# Patient Record
Sex: FEMALE | Race: OTHER | NOT HISPANIC OR LATINO | URBAN - METROPOLITAN AREA
[De-identification: names, ages, dates, MRNs, and addresses within clinical notes are randomized per-mention and may not be internally consistent; named-entity substitution may affect disease eponyms.]

---

## 2023-02-23 ENCOUNTER — OFFICE (OUTPATIENT)
Dept: URBAN - METROPOLITAN AREA CLINIC 11 | Facility: CLINIC | Age: 18
End: 2023-02-23

## 2023-02-23 VITALS
HEART RATE: 81 BPM | OXYGEN SATURATION: 100 % | HEIGHT: 66 IN | DIASTOLIC BLOOD PRESSURE: 78 MMHG | WEIGHT: 141 LBS | BODY MASS INDEX: 22.66 KG/M2 | SYSTOLIC BLOOD PRESSURE: 126 MMHG

## 2023-02-23 DIAGNOSIS — K62.89 OTHER SPECIFIED DISEASES OF ANUS AND RECTUM: ICD-10-CM

## 2023-02-23 DIAGNOSIS — R14.0 ABDOMINAL DISTENSION (GASEOUS): ICD-10-CM

## 2023-02-23 DIAGNOSIS — D50.9 IRON DEFICIENCY ANEMIA, UNSPECIFIED: ICD-10-CM

## 2023-02-23 PROCEDURE — 99203 OFFICE O/P NEW LOW 30 MIN: CPT | Performed by: INTERNAL MEDICINE

## 2023-02-23 RX ORDER — FAMOTIDINE 40 MG/1
80 TABLET, FILM COATED ORAL
Qty: 180 | Refills: 3 | Status: COMPLETED
Start: 2023-02-23 | End: 2024-04-29

## 2023-02-23 NOTE — SERVICENOTES
Attending Attestation:  Patient seen and examined independently by me, Dr. Travon Cabral.  Prior records reviewed and case discussed with NANCI Reyna after which impression and plan were developed.

## 2023-02-23 NOTE — SERVICEHPINOTES
Ms. Steele presents today for bloating that presented over the past 6 months. It tends to occur 4 times per week. It is aggravated by eating. No particular aggravating foods. Taking Pepcid as needed does some. She was seen by an allergist and is awaiting lab results. She has retrosternal burning once every 2-3 weeks. No issues with dysphagia.saturnino Romerobonita has had issues with rectal pain that occurs typically prior to her menstrual cycle. The pain will last for about an hour at a time a few times over 2-3 days. It is is characterized as a bad cramp. She denies presence of rectal protrusions. No rectal itching. No rectal bleeding. After the pain is present she may go a day without a bowel movement, but otherwise she has 1-2 bowel movements per day. Stools are formed, brown, and soft.  Often before she has a bowel movement she gets light-headed and nauseous. After the bowel movement the light-headedness and nausea will resolve. She denies vomiting.
saturnino matamoros She was recently noted to have anemia with her PCP, but she is unsure who her PCP is. She notes that the first 3 days of her cycle are very heavy. She will have to change her pads every 2-3 hours at that time. She was placed on iron, and it caused her stomach to be upset.
saturnino matamoros MD Note:    Six months of bloating which is most prominent in the periumbilical region but radiates through the abdomen.  Bloating may last up to 24 hours when present.  There is no excessive belching or flatus.  Bloating is not modified by defecation.  Bloating generally begins after eating.  She has occasional constipation.  Minimal improvement in her bloating with Pepcid.  Long history of intermittent rectal pain with this being absent for up to six months.  Pain is most common just before menstrual cycle last 2-3 days.  Pain is sharp in character.  There is no anorectal itching or burning.  No hematochezia.  She reports a recent diagnosis of iron deficiency with menorrhagia.

## 2023-02-24 LAB — C-REACTIVE PROTEIN, QUANT: <1 MG/L

## 2023-07-03 ENCOUNTER — AMBULATORY SURGICAL CENTER (OUTPATIENT)
Dept: URBAN - METROPOLITAN AREA SURGERY 3 | Facility: SURGERY | Age: 18
End: 2023-07-03

## 2023-07-03 ENCOUNTER — OFFICE (OUTPATIENT)
Dept: URBAN - METROPOLITAN AREA PATHOLOGY 12 | Facility: PATHOLOGY | Age: 18
End: 2023-07-03

## 2023-07-03 VITALS
HEART RATE: 71 BPM | RESPIRATION RATE: 17 BRPM | SYSTOLIC BLOOD PRESSURE: 117 MMHG | SYSTOLIC BLOOD PRESSURE: 122 MMHG | DIASTOLIC BLOOD PRESSURE: 58 MMHG | DIASTOLIC BLOOD PRESSURE: 59 MMHG | RESPIRATION RATE: 20 BRPM | WEIGHT: 150 LBS | OXYGEN SATURATION: 100 % | OXYGEN SATURATION: 100 % | SYSTOLIC BLOOD PRESSURE: 104 MMHG | DIASTOLIC BLOOD PRESSURE: 68 MMHG | SYSTOLIC BLOOD PRESSURE: 98 MMHG | HEART RATE: 66 BPM | DIASTOLIC BLOOD PRESSURE: 54 MMHG | SYSTOLIC BLOOD PRESSURE: 92 MMHG | RESPIRATION RATE: 17 BRPM | OXYGEN SATURATION: 99 % | TEMPERATURE: 98.1 F | HEART RATE: 86 BPM | RESPIRATION RATE: 19 BRPM | HEIGHT: 66 IN | RESPIRATION RATE: 16 BRPM | SYSTOLIC BLOOD PRESSURE: 104 MMHG | DIASTOLIC BLOOD PRESSURE: 59 MMHG | TEMPERATURE: 98.1 F | RESPIRATION RATE: 19 BRPM | OXYGEN SATURATION: 99 % | HEART RATE: 86 BPM | OXYGEN SATURATION: 99 % | SYSTOLIC BLOOD PRESSURE: 104 MMHG | TEMPERATURE: 98.1 F | HEIGHT: 66 IN | HEART RATE: 64 BPM | DIASTOLIC BLOOD PRESSURE: 58 MMHG | TEMPERATURE: 98.2 F | SYSTOLIC BLOOD PRESSURE: 117 MMHG | DIASTOLIC BLOOD PRESSURE: 59 MMHG | RESPIRATION RATE: 20 BRPM | RESPIRATION RATE: 16 BRPM | SYSTOLIC BLOOD PRESSURE: 92 MMHG | SYSTOLIC BLOOD PRESSURE: 117 MMHG | RESPIRATION RATE: 20 BRPM | DIASTOLIC BLOOD PRESSURE: 69 MMHG | DIASTOLIC BLOOD PRESSURE: 68 MMHG | HEIGHT: 66 IN | HEART RATE: 66 BPM | HEART RATE: 71 BPM | OXYGEN SATURATION: 100 % | DIASTOLIC BLOOD PRESSURE: 54 MMHG | HEART RATE: 86 BPM | DIASTOLIC BLOOD PRESSURE: 69 MMHG | DIASTOLIC BLOOD PRESSURE: 68 MMHG | DIASTOLIC BLOOD PRESSURE: 69 MMHG | DIASTOLIC BLOOD PRESSURE: 58 MMHG | SYSTOLIC BLOOD PRESSURE: 98 MMHG | RESPIRATION RATE: 19 BRPM | SYSTOLIC BLOOD PRESSURE: 98 MMHG | WEIGHT: 150 LBS | RESPIRATION RATE: 16 BRPM | HEART RATE: 59 BPM | SYSTOLIC BLOOD PRESSURE: 122 MMHG | HEART RATE: 66 BPM | SYSTOLIC BLOOD PRESSURE: 122 MMHG | TEMPERATURE: 98.2 F | TEMPERATURE: 98.2 F | HEART RATE: 64 BPM | DIASTOLIC BLOOD PRESSURE: 54 MMHG | HEART RATE: 59 BPM | HEART RATE: 64 BPM | WEIGHT: 150 LBS | HEART RATE: 71 BPM | HEART RATE: 59 BPM | SYSTOLIC BLOOD PRESSURE: 92 MMHG | RESPIRATION RATE: 17 BRPM

## 2023-07-03 DIAGNOSIS — D50.9 IRON DEFICIENCY ANEMIA, UNSPECIFIED: ICD-10-CM

## 2023-07-03 DIAGNOSIS — K62.89 OTHER SPECIFIED DISEASES OF ANUS AND RECTUM: ICD-10-CM

## 2023-07-03 DIAGNOSIS — K51.90 ULCERATIVE COLITIS, UNSPECIFIED, WITHOUT COMPLICATIONS: ICD-10-CM

## 2023-07-03 DIAGNOSIS — K31.89 OTHER DISEASES OF STOMACH AND DUODENUM: ICD-10-CM

## 2023-07-03 DIAGNOSIS — R14.0 ABDOMINAL DISTENSION (GASEOUS): ICD-10-CM

## 2023-07-03 DIAGNOSIS — K63.89 OTHER SPECIFIED DISEASES OF INTESTINE: ICD-10-CM

## 2023-07-03 PROCEDURE — 45331 SIGMOIDOSCOPY AND BIOPSY: CPT | Mod: 51 | Performed by: INTERNAL MEDICINE

## 2023-07-03 PROCEDURE — 43239 EGD BIOPSY SINGLE/MULTIPLE: CPT | Performed by: INTERNAL MEDICINE

## 2023-07-03 PROCEDURE — 45331 SIGMOIDOSCOPY AND BIOPSY: CPT | Mod: 59 | Performed by: INTERNAL MEDICINE

## 2023-07-03 PROCEDURE — 88305 TISSUE EXAM BY PATHOLOGIST: CPT | Performed by: PATHOLOGY

## 2023-09-05 ENCOUNTER — OFFICE (OUTPATIENT)
Dept: URBAN - METROPOLITAN AREA CLINIC 11 | Facility: CLINIC | Age: 18
End: 2023-09-05

## 2023-09-05 VITALS
DIASTOLIC BLOOD PRESSURE: 81 MMHG | BODY MASS INDEX: 24.4 KG/M2 | WEIGHT: 151.8 LBS | SYSTOLIC BLOOD PRESSURE: 122 MMHG | HEART RATE: 75 BPM | OXYGEN SATURATION: 99 % | HEIGHT: 66 IN

## 2023-09-05 DIAGNOSIS — K51.211 ULCERATIVE (CHRONIC) PROCTITIS WITH RECTAL BLEEDING: ICD-10-CM

## 2023-09-05 PROCEDURE — 99213 OFFICE O/P EST LOW 20 MIN: CPT | Performed by: INTERNAL MEDICINE

## 2024-04-29 ENCOUNTER — OFFICE (OUTPATIENT)
Dept: URBAN - METROPOLITAN AREA CLINIC 9 | Facility: CLINIC | Age: 19
End: 2024-04-29

## 2024-04-29 VITALS
HEIGHT: 66 IN | WEIGHT: 149 LBS | SYSTOLIC BLOOD PRESSURE: 122 MMHG | HEART RATE: 69 BPM | DIASTOLIC BLOOD PRESSURE: 86 MMHG

## 2024-04-29 DIAGNOSIS — K52.89 OTHER SPECIFIED NONINFECTIVE GASTROENTERITIS AND COLITIS: ICD-10-CM

## 2024-04-29 DIAGNOSIS — K51.211 ULCERATIVE (CHRONIC) PROCTITIS WITH RECTAL BLEEDING: ICD-10-CM

## 2024-04-29 PROCEDURE — 99214 OFFICE O/P EST MOD 30 MIN: CPT | Performed by: INTERNAL MEDICINE

## 2024-04-29 NOTE — SERVICEHPINOTES
Ms. Steele presents today for recent onset of diarrhea that started on 4/10/24. She has a history significant for ulcerative proctitis on Balsalazide BID. She notes that she initially had passage of 1-2 loose stool per day that has now progressed to to 10-12 liquid stools per day. She notes that she passes a small amount of bright red blood with bowel movements intermittently. She denies presence of fever, but she has had chills for the past week. She denies any recent antibiotics or sick contacts prior to onset of symptoms. She notes that she went to the ED on 4/24/24 with labs K 3.3, AST 15, ALT 17, Hct 43.3, Hgb 14.4. She received IV fluids and antiemetics and was discharged. She returned to the ED over the weekend to worsening of symptoms at which time a CT was performed with colitis involving the ascending colon and transverse colon with mildly prominent adjacent lymph nodes. Labs at that time with hct 35.5, Hgb 11.7, WBC 5.3, K 3.7, stool with rare WBC, negative cdiff. Stool culture and O&amp P are still pending. She was discharged on prednisone and cipro. She is currently taking the Prednisone, but she did not start the Cipro for fear that it would worsen her symptoms.
saturnino matamoros MD Note:   She began having loose stools six weeks ago with gradual increase in stool frequency.  Marked increase in stool frequency over the last week with 10-12 liquid stools daily.  History discussed with her mother and sister and she has been noncompliant with therapy for at least the last six weeks and has only been taking two capsules of balsalazide daily at the most.  Prior to the worsening of her symptoms, she was only taking two capsules twice a day.  No recent antibiotics.

## 2024-04-30 ENCOUNTER — AMBULATORY SURGICAL CENTER (OUTPATIENT)
Dept: URBAN - METROPOLITAN AREA SURGERY 3 | Facility: SURGERY | Age: 19
End: 2024-04-30

## 2024-04-30 ENCOUNTER — OFFICE (OUTPATIENT)
Dept: URBAN - METROPOLITAN AREA PATHOLOGY 12 | Facility: PATHOLOGY | Age: 19
End: 2024-04-30

## 2024-04-30 VITALS
OXYGEN SATURATION: 100 % | DIASTOLIC BLOOD PRESSURE: 76 MMHG | HEART RATE: 50 BPM | DIASTOLIC BLOOD PRESSURE: 72 MMHG | DIASTOLIC BLOOD PRESSURE: 65 MMHG | TEMPERATURE: 97.9 F | OXYGEN SATURATION: 99 % | HEART RATE: 51 BPM | DIASTOLIC BLOOD PRESSURE: 68 MMHG | HEIGHT: 66 IN | HEART RATE: 53 BPM | RESPIRATION RATE: 20 BRPM | RESPIRATION RATE: 20 BRPM | SYSTOLIC BLOOD PRESSURE: 120 MMHG | SYSTOLIC BLOOD PRESSURE: 109 MMHG | RESPIRATION RATE: 18 BRPM | HEART RATE: 72 BPM | SYSTOLIC BLOOD PRESSURE: 120 MMHG | OXYGEN SATURATION: 94 % | WEIGHT: 144 LBS | SYSTOLIC BLOOD PRESSURE: 114 MMHG | RESPIRATION RATE: 18 BRPM | DIASTOLIC BLOOD PRESSURE: 65 MMHG | SYSTOLIC BLOOD PRESSURE: 114 MMHG | HEART RATE: 50 BPM | DIASTOLIC BLOOD PRESSURE: 72 MMHG | HEIGHT: 66 IN | DIASTOLIC BLOOD PRESSURE: 71 MMHG | HEART RATE: 53 BPM | OXYGEN SATURATION: 100 % | SYSTOLIC BLOOD PRESSURE: 109 MMHG | HEART RATE: 50 BPM | HEART RATE: 68 BPM | HEART RATE: 53 BPM | SYSTOLIC BLOOD PRESSURE: 109 MMHG | DIASTOLIC BLOOD PRESSURE: 72 MMHG | DIASTOLIC BLOOD PRESSURE: 76 MMHG | HEART RATE: 68 BPM | HEART RATE: 51 BPM | OXYGEN SATURATION: 95 % | OXYGEN SATURATION: 95 % | TEMPERATURE: 97.9 F | SYSTOLIC BLOOD PRESSURE: 114 MMHG | DIASTOLIC BLOOD PRESSURE: 68 MMHG | SYSTOLIC BLOOD PRESSURE: 112 MMHG | WEIGHT: 144 LBS | OXYGEN SATURATION: 99 % | OXYGEN SATURATION: 99 % | DIASTOLIC BLOOD PRESSURE: 71 MMHG | HEART RATE: 68 BPM | OXYGEN SATURATION: 100 % | RESPIRATION RATE: 20 BRPM | SYSTOLIC BLOOD PRESSURE: 120 MMHG | OXYGEN SATURATION: 94 % | HEART RATE: 72 BPM | WEIGHT: 144 LBS | DIASTOLIC BLOOD PRESSURE: 65 MMHG | SYSTOLIC BLOOD PRESSURE: 112 MMHG | RESPIRATION RATE: 18 BRPM | SYSTOLIC BLOOD PRESSURE: 112 MMHG | DIASTOLIC BLOOD PRESSURE: 68 MMHG | HEIGHT: 66 IN | OXYGEN SATURATION: 95 % | DIASTOLIC BLOOD PRESSURE: 76 MMHG | OXYGEN SATURATION: 94 % | HEART RATE: 51 BPM | DIASTOLIC BLOOD PRESSURE: 71 MMHG | HEART RATE: 72 BPM | TEMPERATURE: 97.9 F

## 2024-04-30 DIAGNOSIS — R19.7 DIARRHEA, UNSPECIFIED: ICD-10-CM

## 2024-04-30 DIAGNOSIS — K51.80 OTHER ULCERATIVE COLITIS WITHOUT COMPLICATIONS: ICD-10-CM

## 2024-04-30 DIAGNOSIS — K63.89 OTHER SPECIFIED DISEASES OF INTESTINE: ICD-10-CM

## 2024-04-30 PROCEDURE — 45331 SIGMOIDOSCOPY AND BIOPSY: CPT | Performed by: INTERNAL MEDICINE

## 2024-04-30 PROCEDURE — 88305 TISSUE EXAM BY PATHOLOGIST: CPT | Mod: TC | Performed by: STUDENT IN AN ORGANIZED HEALTH CARE EDUCATION/TRAINING PROGRAM

## 2024-05-21 ENCOUNTER — OFFICE (OUTPATIENT)
Dept: URBAN - METROPOLITAN AREA CLINIC 11 | Facility: CLINIC | Age: 19
End: 2024-05-21

## 2024-05-21 VITALS
SYSTOLIC BLOOD PRESSURE: 117 MMHG | WEIGHT: 142 LBS | DIASTOLIC BLOOD PRESSURE: 84 MMHG | HEART RATE: 87 BPM | HEIGHT: 66 IN | OXYGEN SATURATION: 96 %

## 2024-05-21 DIAGNOSIS — K51.90 ULCERATIVE COLITIS, UNSPECIFIED, WITHOUT COMPLICATIONS: ICD-10-CM

## 2024-05-21 PROCEDURE — 99214 OFFICE O/P EST MOD 30 MIN: CPT | Performed by: INTERNAL MEDICINE

## 2024-07-31 ENCOUNTER — OFFICE (OUTPATIENT)
Dept: URBAN - METROPOLITAN AREA CLINIC 11 | Facility: CLINIC | Age: 19
End: 2024-07-31

## 2024-07-31 VITALS
SYSTOLIC BLOOD PRESSURE: 118 MMHG | DIASTOLIC BLOOD PRESSURE: 75 MMHG | HEART RATE: 84 BPM | WEIGHT: 144 LBS | OXYGEN SATURATION: 97 % | HEIGHT: 66 IN

## 2024-07-31 DIAGNOSIS — D50.9 IRON DEFICIENCY ANEMIA, UNSPECIFIED: ICD-10-CM

## 2024-07-31 DIAGNOSIS — K51.90 ULCERATIVE COLITIS, UNSPECIFIED, WITHOUT COMPLICATIONS: ICD-10-CM

## 2024-07-31 DIAGNOSIS — L30.9 DERMATITIS, UNSPECIFIED: ICD-10-CM

## 2024-07-31 PROCEDURE — 99214 OFFICE O/P EST MOD 30 MIN: CPT | Performed by: INTERNAL MEDICINE

## 2025-01-06 ENCOUNTER — AMBULATORY SURGICAL CENTER (OUTPATIENT)
Dept: URBAN - METROPOLITAN AREA SURGERY 3 | Facility: SURGERY | Age: 20
End: 2025-01-06

## 2025-01-06 ENCOUNTER — OFFICE (OUTPATIENT)
Dept: URBAN - METROPOLITAN AREA PATHOLOGY 12 | Facility: PATHOLOGY | Age: 20
End: 2025-01-06

## 2025-01-06 VITALS
HEART RATE: 82 BPM | SYSTOLIC BLOOD PRESSURE: 93 MMHG | HEART RATE: 82 BPM | HEART RATE: 62 BPM | SYSTOLIC BLOOD PRESSURE: 95 MMHG | RESPIRATION RATE: 23 BRPM | SYSTOLIC BLOOD PRESSURE: 101 MMHG | RESPIRATION RATE: 21 BRPM | RESPIRATION RATE: 17 BRPM | TEMPERATURE: 98.2 F | HEART RATE: 67 BPM | HEART RATE: 77 BPM | DIASTOLIC BLOOD PRESSURE: 56 MMHG | HEART RATE: 72 BPM | HEART RATE: 72 BPM | RESPIRATION RATE: 16 BRPM | SYSTOLIC BLOOD PRESSURE: 122 MMHG | OXYGEN SATURATION: 100 % | DIASTOLIC BLOOD PRESSURE: 78 MMHG | OXYGEN SATURATION: 97 % | DIASTOLIC BLOOD PRESSURE: 66 MMHG | SYSTOLIC BLOOD PRESSURE: 101 MMHG | RESPIRATION RATE: 25 BRPM | RESPIRATION RATE: 16 BRPM | HEART RATE: 70 BPM | TEMPERATURE: 98.2 F | DIASTOLIC BLOOD PRESSURE: 78 MMHG | DIASTOLIC BLOOD PRESSURE: 76 MMHG | SYSTOLIC BLOOD PRESSURE: 122 MMHG | HEART RATE: 67 BPM | TEMPERATURE: 98.3 F | WEIGHT: 152 LBS | HEART RATE: 70 BPM | OXYGEN SATURATION: 97 % | SYSTOLIC BLOOD PRESSURE: 118 MMHG | SYSTOLIC BLOOD PRESSURE: 105 MMHG | DIASTOLIC BLOOD PRESSURE: 63 MMHG | DIASTOLIC BLOOD PRESSURE: 56 MMHG | SYSTOLIC BLOOD PRESSURE: 105 MMHG | HEART RATE: 77 BPM | OXYGEN SATURATION: 98 % | DIASTOLIC BLOOD PRESSURE: 53 MMHG | DIASTOLIC BLOOD PRESSURE: 66 MMHG | TEMPERATURE: 98.3 F | OXYGEN SATURATION: 100 % | RESPIRATION RATE: 21 BRPM | SYSTOLIC BLOOD PRESSURE: 91 MMHG | DIASTOLIC BLOOD PRESSURE: 76 MMHG | DIASTOLIC BLOOD PRESSURE: 53 MMHG | SYSTOLIC BLOOD PRESSURE: 118 MMHG | RESPIRATION RATE: 25 BRPM | SYSTOLIC BLOOD PRESSURE: 91 MMHG | WEIGHT: 152 LBS | SYSTOLIC BLOOD PRESSURE: 93 MMHG | HEIGHT: 66 IN | HEIGHT: 66 IN | OXYGEN SATURATION: 98 % | RESPIRATION RATE: 17 BRPM | RESPIRATION RATE: 23 BRPM | HEART RATE: 62 BPM | DIASTOLIC BLOOD PRESSURE: 63 MMHG | SYSTOLIC BLOOD PRESSURE: 95 MMHG

## 2025-01-06 DIAGNOSIS — K51.90 ULCERATIVE COLITIS, UNSPECIFIED, WITHOUT COMPLICATIONS: ICD-10-CM

## 2025-01-06 PROCEDURE — 45380 COLONOSCOPY AND BIOPSY: CPT | Performed by: INTERNAL MEDICINE

## 2025-01-06 PROCEDURE — 88305 TISSUE EXAM BY PATHOLOGIST: CPT | Performed by: PATHOLOGY

## 2025-01-08 LAB
GASTRO ONE PATHOLOGY: PDF REPORT: (no result)
GASTRO ONE PATHOLOGY: PDF REPORT: (no result)